# Patient Record
Sex: FEMALE | Race: WHITE | NOT HISPANIC OR LATINO | URBAN - METROPOLITAN AREA
[De-identification: names, ages, dates, MRNs, and addresses within clinical notes are randomized per-mention and may not be internally consistent; named-entity substitution may affect disease eponyms.]

---

## 2021-03-08 ENCOUNTER — INPATIENT (INPATIENT)
Facility: HOSPITAL | Age: 61
LOS: 3 days | Discharge: HOME | DRG: 177 | End: 2021-03-12
Attending: INTERNAL MEDICINE | Admitting: INTERNAL MEDICINE
Payer: COMMERCIAL

## 2021-03-08 VITALS
TEMPERATURE: 98 F | SYSTOLIC BLOOD PRESSURE: 135 MMHG | DIASTOLIC BLOOD PRESSURE: 82 MMHG | RESPIRATION RATE: 19 BRPM | HEART RATE: 90 BPM | OXYGEN SATURATION: 91 %

## 2021-03-08 DIAGNOSIS — U07.1 COVID-19: ICD-10-CM

## 2021-03-08 DIAGNOSIS — Z90.10 ACQUIRED ABSENCE OF UNSPECIFIED BREAST AND NIPPLE: Chronic | ICD-10-CM

## 2021-03-08 LAB
ALBUMIN SERPL ELPH-MCNC: 3.7 G/DL — SIGNIFICANT CHANGE UP (ref 3.3–5.2)
ALBUMIN SERPL ELPH-MCNC: 3.9 G/DL — SIGNIFICANT CHANGE UP (ref 3.3–5.2)
ALP SERPL-CCNC: 61 U/L — SIGNIFICANT CHANGE UP (ref 40–120)
ALP SERPL-CCNC: 67 U/L — SIGNIFICANT CHANGE UP (ref 40–120)
ALT FLD-CCNC: 18 U/L — SIGNIFICANT CHANGE UP
ALT FLD-CCNC: 20 U/L — SIGNIFICANT CHANGE UP
ANION GAP SERPL CALC-SCNC: 13 MMOL/L — SIGNIFICANT CHANGE UP (ref 5–17)
AST SERPL-CCNC: 28 U/L — SIGNIFICANT CHANGE UP
AST SERPL-CCNC: 30 U/L — SIGNIFICANT CHANGE UP
BASOPHILS # BLD AUTO: 0.02 K/UL — SIGNIFICANT CHANGE UP (ref 0–0.2)
BASOPHILS NFR BLD AUTO: 0.3 % — SIGNIFICANT CHANGE UP (ref 0–2)
BILIRUB DIRECT SERPL-MCNC: 0.1 MG/DL — SIGNIFICANT CHANGE UP (ref 0–0.3)
BILIRUB INDIRECT FLD-MCNC: 0.3 MG/DL — SIGNIFICANT CHANGE UP (ref 0.2–1)
BILIRUB SERPL-MCNC: 0.3 MG/DL — LOW (ref 0.4–2)
BILIRUB SERPL-MCNC: 0.4 MG/DL — SIGNIFICANT CHANGE UP (ref 0.4–2)
BUN SERPL-MCNC: 16 MG/DL — SIGNIFICANT CHANGE UP (ref 8–20)
CALCIUM SERPL-MCNC: 8.8 MG/DL — SIGNIFICANT CHANGE UP (ref 8.6–10.2)
CHLORIDE SERPL-SCNC: 96 MMOL/L — LOW (ref 98–107)
CO2 SERPL-SCNC: 24 MMOL/L — SIGNIFICANT CHANGE UP (ref 22–29)
CREAT SERPL-MCNC: 0.74 MG/DL — SIGNIFICANT CHANGE UP (ref 0.5–1.3)
CREAT SERPL-MCNC: 0.8 MG/DL — SIGNIFICANT CHANGE UP (ref 0.5–1.3)
CRP SERPL-MCNC: 38 MG/L — HIGH
D DIMER BLD IA.RAPID-MCNC: 206 NG/ML DDU — SIGNIFICANT CHANGE UP
EOSINOPHIL # BLD AUTO: 0 K/UL — SIGNIFICANT CHANGE UP (ref 0–0.5)
EOSINOPHIL NFR BLD AUTO: 0 % — SIGNIFICANT CHANGE UP (ref 0–6)
FERRITIN SERPL-MCNC: 226 NG/ML — HIGH (ref 15–150)
GLUCOSE BLDC GLUCOMTR-MCNC: 137 MG/DL — HIGH (ref 70–99)
GLUCOSE BLDC GLUCOMTR-MCNC: 137 MG/DL — HIGH (ref 70–99)
GLUCOSE SERPL-MCNC: 135 MG/DL — HIGH (ref 70–99)
HCT VFR BLD CALC: 38.2 % — SIGNIFICANT CHANGE UP (ref 34.5–45)
HGB BLD-MCNC: 13.1 G/DL — SIGNIFICANT CHANGE UP (ref 11.5–15.5)
IMM GRANULOCYTES NFR BLD AUTO: 0.4 % — SIGNIFICANT CHANGE UP (ref 0–1.5)
INR BLD: 1.03 RATIO — SIGNIFICANT CHANGE UP (ref 0.88–1.16)
LYMPHOCYTES # BLD AUTO: 0.6 K/UL — LOW (ref 1–3.3)
LYMPHOCYTES # BLD AUTO: 8.9 % — LOW (ref 13–44)
MCHC RBC-ENTMCNC: 28.9 PG — SIGNIFICANT CHANGE UP (ref 27–34)
MCHC RBC-ENTMCNC: 34.3 GM/DL — SIGNIFICANT CHANGE UP (ref 32–36)
MCV RBC AUTO: 84.1 FL — SIGNIFICANT CHANGE UP (ref 80–100)
MONOCYTES # BLD AUTO: 0.36 K/UL — SIGNIFICANT CHANGE UP (ref 0–0.9)
MONOCYTES NFR BLD AUTO: 5.3 % — SIGNIFICANT CHANGE UP (ref 2–14)
NEUTROPHILS # BLD AUTO: 5.73 K/UL — SIGNIFICANT CHANGE UP (ref 1.8–7.4)
NEUTROPHILS NFR BLD AUTO: 85.1 % — HIGH (ref 43–77)
PLATELET # BLD AUTO: 172 K/UL — SIGNIFICANT CHANGE UP (ref 150–400)
POTASSIUM SERPL-MCNC: 4.1 MMOL/L — SIGNIFICANT CHANGE UP (ref 3.5–5.3)
POTASSIUM SERPL-SCNC: 4.1 MMOL/L — SIGNIFICANT CHANGE UP (ref 3.5–5.3)
PROCALCITONIN SERPL-MCNC: 0.06 NG/ML — SIGNIFICANT CHANGE UP (ref 0.02–0.1)
PROT SERPL-MCNC: 7.2 G/DL — SIGNIFICANT CHANGE UP (ref 6.6–8.7)
PROT SERPL-MCNC: 7.6 G/DL — SIGNIFICANT CHANGE UP (ref 6.6–8.7)
PROTHROM AB SERPL-ACNC: 11.9 SEC — SIGNIFICANT CHANGE UP (ref 10.6–13.6)
RBC # BLD: 4.54 M/UL — SIGNIFICANT CHANGE UP (ref 3.8–5.2)
RBC # FLD: 13.1 % — SIGNIFICANT CHANGE UP (ref 10.3–14.5)
SARS-COV-2 RNA SPEC QL NAA+PROBE: DETECTED
SODIUM SERPL-SCNC: 133 MMOL/L — LOW (ref 135–145)
TROPONIN T SERPL-MCNC: <0.01 NG/ML — SIGNIFICANT CHANGE UP (ref 0–0.06)
WBC # BLD: 6.74 K/UL — SIGNIFICANT CHANGE UP (ref 3.8–10.5)
WBC # FLD AUTO: 6.74 K/UL — SIGNIFICANT CHANGE UP (ref 3.8–10.5)

## 2021-03-08 PROCEDURE — 93010 ELECTROCARDIOGRAM REPORT: CPT

## 2021-03-08 PROCEDURE — 99223 1ST HOSP IP/OBS HIGH 75: CPT

## 2021-03-08 PROCEDURE — 99291 CRITICAL CARE FIRST HOUR: CPT

## 2021-03-08 PROCEDURE — 71045 X-RAY EXAM CHEST 1 VIEW: CPT | Mod: 26

## 2021-03-08 RX ORDER — ASCORBIC ACID 60 MG
1500 TABLET,CHEWABLE ORAL DAILY
Refills: 0 | Status: DISCONTINUED | OUTPATIENT
Start: 2021-03-08 | End: 2021-03-12

## 2021-03-08 RX ORDER — ALBUTEROL 90 UG/1
2 AEROSOL, METERED ORAL ONCE
Refills: 0 | Status: COMPLETED | OUTPATIENT
Start: 2021-03-08 | End: 2021-03-08

## 2021-03-08 RX ORDER — INSULIN LISPRO 100/ML
VIAL (ML) SUBCUTANEOUS
Refills: 0 | Status: DISCONTINUED | OUTPATIENT
Start: 2021-03-08 | End: 2021-03-12

## 2021-03-08 RX ORDER — DEXTROSE 50 % IN WATER 50 %
12.5 SYRINGE (ML) INTRAVENOUS ONCE
Refills: 0 | Status: DISCONTINUED | OUTPATIENT
Start: 2021-03-08 | End: 2021-03-12

## 2021-03-08 RX ORDER — DEXTROSE 50 % IN WATER 50 %
25 SYRINGE (ML) INTRAVENOUS ONCE
Refills: 0 | Status: DISCONTINUED | OUTPATIENT
Start: 2021-03-08 | End: 2021-03-12

## 2021-03-08 RX ORDER — ACETAMINOPHEN 500 MG
650 TABLET ORAL ONCE
Refills: 0 | Status: COMPLETED | OUTPATIENT
Start: 2021-03-08 | End: 2021-03-08

## 2021-03-08 RX ORDER — GLUCAGON INJECTION, SOLUTION 0.5 MG/.1ML
1 INJECTION, SOLUTION SUBCUTANEOUS ONCE
Refills: 0 | Status: DISCONTINUED | OUTPATIENT
Start: 2021-03-08 | End: 2021-03-12

## 2021-03-08 RX ORDER — DEXTROSE 50 % IN WATER 50 %
15 SYRINGE (ML) INTRAVENOUS ONCE
Refills: 0 | Status: DISCONTINUED | OUTPATIENT
Start: 2021-03-08 | End: 2021-03-12

## 2021-03-08 RX ORDER — ACETAMINOPHEN 500 MG
650 TABLET ORAL EVERY 6 HOURS
Refills: 0 | Status: DISCONTINUED | OUTPATIENT
Start: 2021-03-08 | End: 2021-03-12

## 2021-03-08 RX ORDER — REMDESIVIR 5 MG/ML
INJECTION INTRAVENOUS
Refills: 0 | Status: DISCONTINUED | OUTPATIENT
Start: 2021-03-08 | End: 2021-03-12

## 2021-03-08 RX ORDER — REMDESIVIR 5 MG/ML
200 INJECTION INTRAVENOUS EVERY 24 HOURS
Refills: 0 | Status: COMPLETED | OUTPATIENT
Start: 2021-03-08 | End: 2021-03-08

## 2021-03-08 RX ORDER — DEXAMETHASONE 0.5 MG/5ML
6 ELIXIR ORAL ONCE
Refills: 0 | Status: COMPLETED | OUTPATIENT
Start: 2021-03-08 | End: 2021-03-08

## 2021-03-08 RX ORDER — ACETAMINOPHEN 500 MG
2 TABLET ORAL
Qty: 0 | Refills: 0 | DISCHARGE

## 2021-03-08 RX ORDER — SODIUM CHLORIDE 9 MG/ML
1000 INJECTION, SOLUTION INTRAVENOUS
Refills: 0 | Status: DISCONTINUED | OUTPATIENT
Start: 2021-03-08 | End: 2021-03-12

## 2021-03-08 RX ORDER — DEXAMETHASONE 0.5 MG/5ML
6 ELIXIR ORAL DAILY
Refills: 0 | Status: DISCONTINUED | OUTPATIENT
Start: 2021-03-09 | End: 2021-03-12

## 2021-03-08 RX ORDER — ENOXAPARIN SODIUM 100 MG/ML
40 INJECTION SUBCUTANEOUS DAILY
Refills: 0 | Status: DISCONTINUED | OUTPATIENT
Start: 2021-03-08 | End: 2021-03-12

## 2021-03-08 RX ORDER — REMDESIVIR 5 MG/ML
100 INJECTION INTRAVENOUS EVERY 24 HOURS
Refills: 0 | Status: DISCONTINUED | OUTPATIENT
Start: 2021-03-09 | End: 2021-03-12

## 2021-03-08 RX ADMIN — Medication 1500 MILLIGRAM(S): at 17:11

## 2021-03-08 RX ADMIN — Medication 650 MILLIGRAM(S): at 10:17

## 2021-03-08 RX ADMIN — Medication 650 MILLIGRAM(S): at 22:00

## 2021-03-08 RX ADMIN — Medication 6 MILLIGRAM(S): at 10:17

## 2021-03-08 RX ADMIN — ENOXAPARIN SODIUM 40 MILLIGRAM(S): 100 INJECTION SUBCUTANEOUS at 17:11

## 2021-03-08 RX ADMIN — REMDESIVIR 200 MILLIGRAM(S): 5 INJECTION INTRAVENOUS at 17:11

## 2021-03-08 RX ADMIN — Medication 650 MILLIGRAM(S): at 21:20

## 2021-03-08 RX ADMIN — Medication 1 TABLET(S): at 17:12

## 2021-03-08 RX ADMIN — ALBUTEROL 2 PUFF(S): 90 AEROSOL, METERED ORAL at 10:16

## 2021-03-08 RX ADMIN — Medication 650 MILLIGRAM(S): at 11:17

## 2021-03-08 NOTE — ED PROVIDER NOTE - OBJECTIVE STATEMENT
Patient is a 60 year old female with history of breast cancer in remission presenting with weakness and sob. Pt diagnosed with COVID about 10 days ago and notes symptoms have been progressively worsening to the point where she feels like she is unable to eat or take care of herself. Pt Notes mild chest discomfort with breathing. symptoms worse with exertion. No LE pain or swelling. No abd pain n/v/d. No history of VTE or heart disease. - fevers sweats and chills. On zpack and steroids.,

## 2021-03-08 NOTE — ED PROVIDER NOTE - CLINICAL SUMMARY MEDICAL DECISION MAKING FREE TEXT BOX
pt with worsening covid symptoms and hypoxia. will obtain coivd workup, steroids, Tylenol. NC. will need admission

## 2021-03-08 NOTE — CONSULT NOTE ADULT - ASSESSMENT
60y  Female with h/o breast cancer s/p R sided mastectomy and L sided partial reduction who presents with worsening sob. Pt reports she was diagnosed with COVID 2/26 and notes symptoms have been progressively worsening to the point where she feels like she is unable to eat or take care of herself. She has lack of smell and taste and continues to have subjective fevers, last one was last night. Also reports progressively worsening sob with associated non productive cough and intermittent mild chest discomfort from coughing so much. She feels completely out of breath  just ambulating minimally. Others in her family have noted how out of breath she becomes. Due to this she went to go see her pmd 2 days ago who prescribed her an inhaler, steroids and a z pack, of which she took for 2 days and thereafter she felt no improvement in her sx and decided to come to the ED. In the ED noted to be hypoxic requiring O2 via NC. COVID 19 positive.    Acute Hypoxic respiratory failure  COVID-19 infection  B/L Pneumonia   cough  shortness of breath      - COVID 19 PCR Positive   - CXR + PNA  - Continue supportive care measures  - continue to trend inflammatory markers.   - trend CBC with diff, CMP,  CRP, Ferritin,  procalcitonin q 48hours  - Avoid antibiotics unless there is a concern for a bacterial infection  - May consider vitamin C, thiamine and zinc (note lack of evidence to support benefit with COVID 19)  - Discussed Remdesivir with the patient.   - Start Remdesivir 200mg IV x1 followed by 100mg IV daily   - Dexamethasone 6mg PO Daily  - follow up all outstanding cultures  - Trend Fever  - Trend Leukocytosis    Will follow

## 2021-03-08 NOTE — H&P ADULT - HISTORY OF PRESENT ILLNESS
60 year old female with history of breast cancer s/p R sided mastectomy and L sided partial reduction who presents with worsening sob. Pt reports she was diagnosed with COVID about 10 days ago and notes symptoms have been progressively worsening to the point where she feels like she is unable to eat or take care of herself. Pt Notes mild chest discomfort with breathing. symptoms worse with exertion. No LE pain or swelling. No abd pain n/v/d. No history of VTE or heart disease. - fevers sweats and chills. On zpack and steroids Give pain meds and re-evaluate 60 year old female with history of breast cancer s/p R sided mastectomy and L sided partial reduction who presents with worsening sob. Pt reports she was diagnosed with COVID about 10 days ago and notes symptoms have been progressively worsening to the point where she feels like she is unable to eat or take care of herself. She has lack of smell and taste and continues to have subjective fevers, last one was last night. Also reports progressively worsening sob with associated non productive cough and intermittent mild chest discomfort from coughing so much. She feels completely out of breath  just ambulating minimally. Others in her family have noted how out of breath she becomes. Due to this she went to go see her pmd 2 days ago who prescribed her an inhaler, steroids and a z pack, of which she took for 2 days and thereafter she felt no improvement in her sx and decided to come to the ED. She states she tested positive from sick contact: her father who had to go to the [facility] for tx and returned positive thereafter. 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

## 2021-03-08 NOTE — H&P ADULT - ASSESSMENT
60 year old female with history of breast cancer s/p R sided mastectomy and L sided partial reduction (2011) who presents with worsening sob. Tested positive for covid 10 days  prior to admission, currently noted with elevated inflammatory markers, b/l ggo on cxr and hypoxic to high 80s in the ed requiring 2l via nc. Pt admitted with acute respiratory failure with hypoxia 2/2 covid 19 pna.     Admit to any isolation bed       Acute respiratory failure with hypoxia 2/2 covid 19 pna   -afebrile   - no leukocytosis     - covid positive test 10 days ago   - awaiting covid pcr   - elevated inflammatory markers crp: 38   ferritin: 226  -f/u ldh   -d dimer wnl  - will trend inflammatory markers q 48hrs   - c/w decadron 6mg IV QD  - ID consulted will start remdesevir  (will monitor lfts and renal fxn closely on this medication)   - c/w supportive care meds with tylenol prn, tessalon pearls prn and robitussin prn   - pt educated on how to prone/ deep breathing exercises  -c/w proning/ chest pt/ incentive spirometry      Hyponatremia  - likely 2/2 decreased po intake  -sodium 134  - pt instructed to increase diet/ take in fluid orally    Hx  breast cancer  -s/p R sided masectomy and left sided reduction in 2011  - stable in remission    DVT ppx  -c/w lovenox 40mg sq qd (awaiting weight check if bmi >30 will change to bid dosing)     Activity level: Increase as tolerated  Baseline: Independent in ADLs     Dispo: Pt lives at home and 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  60 year old female with history of breast cancer s/p R sided mastectomy and L sided partial reduction (2011) who presents with worsening sob. Tested positive for covid 10 days  prior to admission, currently noted with elevated inflammatory markers, b/l ggo on cxr and hypoxic to high 80s in the ed requiring 2l via nc. Pt admitted with acute respiratory failure with hypoxia 2/2 covid 19 pna.     Admit to any isolation bed       Acute respiratory failure with hypoxia 2/2 covid 19 pna   -afebrile   - no leukocytosis     - covid positive test 10 days ago   - awaiting covid pcr   - elevated inflammatory markers crp: 38   ferritin: 226  -f/u ldh   -d dimer wnl  - will trend inflammatory markers q 48hrs   - c/w decadron 6mg IV QD  - started remdesevir  (will monitor lfts and renal fxn closely on this medication)   - c/w supportive care meds with tylenol prn, tessalon pearls prn and robitussin prn   - pt educated on how to prone/ deep breathing exercises  -c/w proning/ chest pt/ incentive spirometry  -ID consulted noted and appreciated       Hyponatremia  - likely 2/2 decreased po intake  -sodium 134  - pt instructed to increase diet/ take in fluid orally    Hx  breast cancer  -s/p R sided masectomy and left sided reduction in 2011  - stable in remission    DVT ppx  -c/w lovenox 40mg sq qd (awaiting weight check if bmi >30 will change to bid dosing)     Activity level: Increase as tolerated  Baseline: Independent in ADLs     Dispo: Pt lives at home and 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  60 year old female with history of breast cancer s/p R sided mastectomy and L sided partial reduction (2011) who presents with worsening sob. Tested positive for covid 10 days  prior to admission, currently noted with elevated inflammatory markers, b/l ggo on cxr and hypoxic to high 80s in the ed requiring 2l via nc. Pt admitted with acute respiratory failure with hypoxia 2/2 covid 19 pna.     Admit to any isolation bed       Acute respiratory failure with hypoxia 2/2 covid 19 pna   -afebrile   - no leukocytosis     - covid positive test 10 days ago   - awaiting covid pcr   - elevated inflammatory markers crp: 38   ferritin: 226  -f/u ldh   -d dimer wnl  - will trend inflammatory markers q 48hrs   - c/w decadron 6mg IV QD  - started remdesevir  (will monitor lfts and renal fxn closely on this medication)   - c/w supportive care meds with tylenol prn, tessalon pearls prn and robitussin prn   - pt educated on how to prone/ deep breathing exercises  -c/w proning/ chest pt/ incentive spirometry  -ID consulted noted and appreciated       Hyponatremia  - likely 2/2 decreased po intake  -sodium 134  - pt instructed to increase diet/ take in fluid orally    Hx  breast cancer  -s/p R sided masectomy and left sided reduction in 2011  - stable in remission    DVT ppx  -c/w lovenox 40mg sq qd     Activity level: Increase as tolerated  Baseline: Independent in ADLs     Dispo: Pt lives at home and 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  60 year old female with history of breast cancer s/p R sided mastectomy and L sided partial reduction (2011) who presents with worsening sob. Tested positive for covid 10 days  prior to admission, currently noted with elevated inflammatory markers, b/l ggo on cxr and hypoxic to high 80s in the ed requiring 2l via nc. Pt admitted with acute respiratory failure with hypoxia 2/2 covid 19 pna.     Admit to any isolation bed       Acute respiratory failure with hypoxia 2/2 covid 19 pna   -afebrile   - no leukocytosis     - covid positive test 10 days ago   - awaiting covid pcr   - elevated inflammatory markers crp: 38   ferritin: 226  -f/u ldh   -d dimer wnl  - will trend inflammatory markers q 48hrs   - c/w decadron 6mg IV QD    - accuchecks ACHS TID and mod sliding scale while on steroids)   - started remdesevir  (will monitor lfts and renal fxn closely on this medication)   - c/w supportive care meds with tylenol prn, tessalon pearls prn and robitussin prn   - pt educated on how to prone/ deep breathing exercises  -c/w proning/ chest pt/ incentive spirometry  -ID consulted noted and appreciated       Hyponatremia  - likely 2/2 decreased po intake  -sodium 134  - pt instructed to increase diet/ take in fluid orally    Hx  breast cancer  -s/p R sided masectomy and left sided reduction in 2011  - stable in remission    DVT ppx  -c/w lovenox 40mg sq qd     Activity level: Increase as tolerated  Baseline: Independent in ADLs     Dispo: Pt lives at home and 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

## 2021-03-08 NOTE — ED ADULT TRIAGE NOTE - CHIEF COMPLAINT QUOTE
Patient arrived to ED today with c/o chest pains and SOB.  Patient tested positive for COVID-19 on the 26th of Feb she states.

## 2021-03-08 NOTE — ED PROVIDER NOTE - PMH
Malignant neoplasm of female breast, unspecified estrogen receptor status, unspecified laterality, unspecified site of breast

## 2021-03-08 NOTE — ED ADULT NURSE NOTE - OBJECTIVE STATEMENT
Assumed care at 1015 pt co weakness, decrease appetite, SOB and chest discomfort after being diagnosed with COVID, pt has hx of anxiety but feels that it is much worse now. pt denies any fevers, chills, N.V, diarrhea, lightheaded. pt also states she had a syncopal event a couple of days ago but never went to the ED. pt placed on cardiac monitor, pulse ox and given oxygen 2L.

## 2021-03-08 NOTE — CONSULT NOTE ADULT - SUBJECTIVE AND OBJECTIVE BOX
[facility]  INFECTIOUS DISEASES AND INTERNAL MEDICINE at [place]  =======================================================  Marshall Enamorado MD  Diplomates American Board of Internal Medicine and Infectious Diseases  Tel: [phone]      Fax: [phone]  =======================================================      [id number]  XANDER BRICENO    CC: Patient is a 60y old  Female who presents with a chief complaint of SOB (08 Mar 2021 12:39)      60y  Female with h/o breast cancer s/p R sided mastectomy and L sided partial reduction who presents with worsening sob. Pt reports she was diagnosed with COVID 2/26 and notes symptoms have been progressively worsening to the point where she feels like she is unable to eat or take care of herself. She has lack of smell and taste and continues to have subjective fevers, last one was last night. Also reports progressively worsening sob with associated non productive cough and intermittent mild chest discomfort from coughing so much. She feels completely out of breath  just ambulating minimally. Others in her family have noted how out of breath she becomes. Due to this she went to go see her pmd 2 days ago who prescribed her an inhaler, steroids and a z pack, of which she took for 2 days and thereafter she felt no improvement in her sx and decided to come to the ED. In the ED noted to be hypoxic requiring O2 via NC. COVID 19 positive. ID input requested.       Past Medical & Surgical Hx:  Malignant neoplasm of female breast, unspecified estrogen receptor status, unspecified laterality, unspecified site of breast  History of mastectomy, unspecified laterality      Social Hx:  Social ETOH       FAMILY HISTORY:  FH: lung cancer - Father  Breast Cancer - Aunt      Allergies  Keflex (Rash)       REVIEW OF SYSTEMS:  CONSTITUTIONAL:  No Fever or chills  HEENT:  No diplopia or blurred vision.  No earache, sore throat or runny nose.  CARDIOVASCULAR:  No pressure, squeezing, strangling, tightness, heaviness or aching about the chest, neck, axilla or epigastrium.  RESPIRATORY:  + cough, + shortness of breath  GASTROINTESTINAL:  No nausea, vomiting or diarrhea.  GENITOURINARY:  No dysuria, frequency or urgency.   MUSCULOSKELETAL:  no joint aches, no muscle pain  SKIN:  No change in skin, hair or nails.  NEUROLOGIC:  No Headaches, seizures  PSYCHIATRIC:  No disorder of thought or mood.  ENDOCRINE:  No heat or cold intolerance  HEMATOLOGICAL:  No easy bruising or bleeding.       Physical Exam:  GEN: NAD, pleasant  HEENT: normocephalic and atraumatic. EOMI. PERRL.  Anicteric  NECK: Supple.   LUNGS: Coarse BS B/L  HEART: Regular rate and rhythm   ABDOMEN: Soft, nontender, and nondistended.  Positive bowel sounds.    : No CVA tenderness  EXTREMITIES: Without any edema.  MSK: No joint swelling  NEUROLOGIC: No Focal Deficits  PSYCHIATRIC: Appropriate affect .  SKIN: No Rash      Weight (kg): 74.525 (03-08 @ 13:41)      Vitals:  T(F): 97.8 (08 Mar 2021 14:32), Max: 97.8 (08 Mar 2021 14:32)  HR: 76 (08 Mar 2021 14:32)  BP: 113/70 (08 Mar 2021 14:32)  RR: 16 (08 Mar 2021 14:32)  SpO2: 96% (08 Mar 2021 14:32) (91% - 96%)  temp max in last 48H T(F): , Max: 97.8 (03-08-21 @ 14:32)      Current Antibiotics:      Other medications:  ascorbic acid 1500 milliGRAM(s) Oral daily  dextrose 40% Gel 15 Gram(s) Oral once  dextrose 5%. 1000 milliLiter(s) IV Continuous <Continuous>  dextrose 5%. 1000 milliLiter(s) IV Continuous <Continuous>  dextrose 50% Injectable 25 Gram(s) IV Push once  dextrose 50% Injectable 12.5 Gram(s) IV Push once  dextrose 50% Injectable 25 Gram(s) IV Push once  enoxaparin Injectable 40 milliGRAM(s) SubCutaneous daily  glucagon  Injectable 1 milliGRAM(s) IntraMuscular once  insulin lispro (ADMELOG) corrective regimen sliding scale   SubCutaneous three times a day before meals  multivitamin 1 Tablet(s) Oral daily                 13.1   6.74  )-----------( 172      ( 08 Mar 2021 10:36 )             38.2     03-08    133<L>  |  96<L>  |  16.0  ----------------------------<  135<H>  4.1   |  24.0  |  0.80    Ca    8.8      08 Mar 2021 10:36    TPro  7.2  /  Alb  3.7  /  TBili  0.3<L>  /  DBili  x   /  AST  28  /  ALT  18  /  AlkPhos  61  03-08        WBC Count: 6.74 K/uL (03-08-21 @ 10:36)    Creatinine, Serum: 0.80 mg/dL (03-08-21 @ 10:36)    C-Reactive Protein, Serum: 38 mg/L (03-08-21 @ 10:36)    Ferritin, Serum: 226 ng/mL (03-08-21 @ 10:36)    Procalcitonin, Serum: 0.06 ng/mL (03-08-21 @ 10:36)    COVID-19 PCR: Detected (03-08-21 @ 10:39)      < from: Xray Chest 1 View- PORTABLE-Urgent (03.08.21 @ 11:06) >  EXAM:  XR CHEST PORTABLE URGENT 1V                          PROCEDURE DATE:  03/08/2021      INTERPRETATION:  TECHNIQUE: Single portable view of the chest.    COMPARISON: None.    CLINICAL HISTORY: Shortness of Breath, Cough,  Fever    FINDINGS:    Single frontal view of the chest demonstrates bilateral lower lobe infiltrates/atelectasis, right worse than left. Right axillary surgical clips. The cardiomediastinal silhouette is normal. No acute osseous abnormalities. Overlying EKG leads andwires are noted.  Consider chest CT as clinically warranted.    IMPRESSION: Bilateral lower lobe infiltrate/atelectasis, right worse than left.    < end of copied text >

## 2021-03-08 NOTE — H&P ADULT - NSICDXPASTMEDICALHX_GEN_ALL_CORE_FT
PAST MEDICAL HISTORY:  Malignant neoplasm of female breast, unspecified estrogen receptor status, unspecified laterality, unspecified site of breast

## 2021-03-08 NOTE — ED PROVIDER NOTE - GASTROINTESTINAL NEGATIVE STATEMENT, MLM
no abdominal pain, no bloating, no constipation, no diarrhea, no nausea and no vomiting.
Images sent/Medical Records sent

## 2021-03-09 LAB
A1C WITH ESTIMATED AVERAGE GLUCOSE RESULT: 5.7 % — HIGH (ref 4–5.6)
ALBUMIN SERPL ELPH-MCNC: 3.6 G/DL — SIGNIFICANT CHANGE UP (ref 3.3–5.2)
ALP SERPL-CCNC: 55 U/L — SIGNIFICANT CHANGE UP (ref 40–120)
ALT FLD-CCNC: 16 U/L — SIGNIFICANT CHANGE UP
ANION GAP SERPL CALC-SCNC: 14 MMOL/L — SIGNIFICANT CHANGE UP (ref 5–17)
AST SERPL-CCNC: 25 U/L — SIGNIFICANT CHANGE UP
BASOPHILS # BLD AUTO: 0.01 K/UL — SIGNIFICANT CHANGE UP (ref 0–0.2)
BASOPHILS NFR BLD AUTO: 0.1 % — SIGNIFICANT CHANGE UP (ref 0–2)
BILIRUB DIRECT SERPL-MCNC: 0.1 MG/DL — SIGNIFICANT CHANGE UP (ref 0–0.3)
BILIRUB INDIRECT FLD-MCNC: 0.2 MG/DL — SIGNIFICANT CHANGE UP (ref 0.2–1)
BILIRUB SERPL-MCNC: 0.3 MG/DL — LOW (ref 0.4–2)
BUN SERPL-MCNC: 16 MG/DL — SIGNIFICANT CHANGE UP (ref 8–20)
CALCIUM SERPL-MCNC: 8.6 MG/DL — SIGNIFICANT CHANGE UP (ref 8.6–10.2)
CHLORIDE SERPL-SCNC: 96 MMOL/L — LOW (ref 98–107)
CO2 SERPL-SCNC: 23 MMOL/L — SIGNIFICANT CHANGE UP (ref 22–29)
CREAT SERPL-MCNC: 0.63 MG/DL — SIGNIFICANT CHANGE UP (ref 0.5–1.3)
EOSINOPHIL # BLD AUTO: 0.16 K/UL — SIGNIFICANT CHANGE UP (ref 0–0.5)
EOSINOPHIL NFR BLD AUTO: 2 % — SIGNIFICANT CHANGE UP (ref 0–6)
ESTIMATED AVERAGE GLUCOSE: 117 MG/DL — HIGH (ref 68–114)
GLUCOSE BLDC GLUCOMTR-MCNC: 109 MG/DL — HIGH (ref 70–99)
GLUCOSE BLDC GLUCOMTR-MCNC: 113 MG/DL — HIGH (ref 70–99)
GLUCOSE BLDC GLUCOMTR-MCNC: 124 MG/DL — HIGH (ref 70–99)
GLUCOSE BLDC GLUCOMTR-MCNC: 138 MG/DL — HIGH (ref 70–99)
GLUCOSE SERPL-MCNC: 94 MG/DL — SIGNIFICANT CHANGE UP (ref 70–99)
HCT VFR BLD CALC: 35.6 % — SIGNIFICANT CHANGE UP (ref 34.5–45)
HCV AB S/CO SERPL IA: 0.05 S/CO — SIGNIFICANT CHANGE UP (ref 0–0.99)
HCV AB SERPL-IMP: SIGNIFICANT CHANGE UP
HGB BLD-MCNC: 12.1 G/DL — SIGNIFICANT CHANGE UP (ref 11.5–15.5)
IMM GRANULOCYTES NFR BLD AUTO: 0.4 % — SIGNIFICANT CHANGE UP (ref 0–1.5)
INR BLD: 1.05 RATIO — SIGNIFICANT CHANGE UP (ref 0.88–1.16)
LYMPHOCYTES # BLD AUTO: 0.61 K/UL — LOW (ref 1–3.3)
LYMPHOCYTES # BLD AUTO: 7.8 % — LOW (ref 13–44)
MCHC RBC-ENTMCNC: 28.5 PG — SIGNIFICANT CHANGE UP (ref 27–34)
MCHC RBC-ENTMCNC: 34 GM/DL — SIGNIFICANT CHANGE UP (ref 32–36)
MCV RBC AUTO: 83.8 FL — SIGNIFICANT CHANGE UP (ref 80–100)
MONOCYTES # BLD AUTO: 0.39 K/UL — SIGNIFICANT CHANGE UP (ref 0–0.9)
MONOCYTES NFR BLD AUTO: 5 % — SIGNIFICANT CHANGE UP (ref 2–14)
NEUTROPHILS # BLD AUTO: 6.66 K/UL — SIGNIFICANT CHANGE UP (ref 1.8–7.4)
NEUTROPHILS NFR BLD AUTO: 84.7 % — HIGH (ref 43–77)
PLATELET # BLD AUTO: 185 K/UL — SIGNIFICANT CHANGE UP (ref 150–400)
POTASSIUM SERPL-MCNC: 4.4 MMOL/L — SIGNIFICANT CHANGE UP (ref 3.5–5.3)
POTASSIUM SERPL-SCNC: 4.4 MMOL/L — SIGNIFICANT CHANGE UP (ref 3.5–5.3)
PROT SERPL-MCNC: 6.6 G/DL — SIGNIFICANT CHANGE UP (ref 6.6–8.7)
PROTHROM AB SERPL-ACNC: 12.2 SEC — SIGNIFICANT CHANGE UP (ref 10.6–13.6)
RBC # BLD: 4.25 M/UL — SIGNIFICANT CHANGE UP (ref 3.8–5.2)
RBC # FLD: 13.1 % — SIGNIFICANT CHANGE UP (ref 10.3–14.5)
SARS-COV-2 IGG SERPL QL IA: NEGATIVE — SIGNIFICANT CHANGE UP
SARS-COV-2 IGM SERPL IA-ACNC: 0.37 INDEX — SIGNIFICANT CHANGE UP
SODIUM SERPL-SCNC: 133 MMOL/L — LOW (ref 135–145)
WBC # BLD: 7.86 K/UL — SIGNIFICANT CHANGE UP (ref 3.8–10.5)
WBC # FLD AUTO: 7.86 K/UL — SIGNIFICANT CHANGE UP (ref 3.8–10.5)

## 2021-03-09 PROCEDURE — 99232 SBSQ HOSP IP/OBS MODERATE 35: CPT

## 2021-03-09 PROCEDURE — 99233 SBSQ HOSP IP/OBS HIGH 50: CPT

## 2021-03-09 RX ADMIN — Medication 6 MILLIGRAM(S): at 05:04

## 2021-03-09 RX ADMIN — REMDESIVIR 500 MILLIGRAM(S): 5 INJECTION INTRAVENOUS at 14:46

## 2021-03-09 RX ADMIN — Medication 100 MILLIGRAM(S): at 11:17

## 2021-03-09 RX ADMIN — Medication 1500 MILLIGRAM(S): at 11:16

## 2021-03-09 RX ADMIN — ENOXAPARIN SODIUM 40 MILLIGRAM(S): 100 INJECTION SUBCUTANEOUS at 11:16

## 2021-03-09 RX ADMIN — Medication 1 TABLET(S): at 11:16

## 2021-03-09 NOTE — PROGRESS NOTE ADULT - ATTENDING COMMENTS
Pt seen and examined.  Dry cough.  Slept proned this afternoon- no surrent sob on 2 L. no CP, abd pain.  loose stool earlier    a and O x 4  RRR  lung: crackles most prominent right.  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

## 2021-03-10 LAB
ALBUMIN SERPL ELPH-MCNC: 3.6 G/DL — SIGNIFICANT CHANGE UP (ref 3.3–5.2)
ALP SERPL-CCNC: 60 U/L — SIGNIFICANT CHANGE UP (ref 40–120)
ALT FLD-CCNC: 19 U/L — SIGNIFICANT CHANGE UP
AST SERPL-CCNC: 25 U/L — SIGNIFICANT CHANGE UP
BILIRUB DIRECT SERPL-MCNC: 0.1 MG/DL — SIGNIFICANT CHANGE UP (ref 0–0.3)
BILIRUB INDIRECT FLD-MCNC: 0.3 MG/DL — SIGNIFICANT CHANGE UP (ref 0.2–1)
BILIRUB SERPL-MCNC: 0.4 MG/DL — SIGNIFICANT CHANGE UP (ref 0.4–2)
CREAT SERPL-MCNC: 0.83 MG/DL — SIGNIFICANT CHANGE UP (ref 0.5–1.3)
CRP SERPL-MCNC: 35 MG/L — HIGH
FERRITIN SERPL-MCNC: 230 NG/ML — HIGH (ref 15–150)
GLUCOSE BLDC GLUCOMTR-MCNC: 123 MG/DL — HIGH (ref 70–99)
GLUCOSE BLDC GLUCOMTR-MCNC: 143 MG/DL — HIGH (ref 70–99)
GLUCOSE BLDC GLUCOMTR-MCNC: 176 MG/DL — HIGH (ref 70–99)
GLUCOSE BLDC GLUCOMTR-MCNC: 98 MG/DL — SIGNIFICANT CHANGE UP (ref 70–99)
INR BLD: 1.15 RATIO — SIGNIFICANT CHANGE UP (ref 0.88–1.16)
LDH SERPL L TO P-CCNC: 299 U/L — HIGH (ref 98–192)
PROT SERPL-MCNC: 6.5 G/DL — LOW (ref 6.6–8.7)
PROTHROM AB SERPL-ACNC: 13.3 SEC — SIGNIFICANT CHANGE UP (ref 10.6–13.6)

## 2021-03-10 PROCEDURE — 99232 SBSQ HOSP IP/OBS MODERATE 35: CPT

## 2021-03-10 RX ADMIN — Medication 650 MILLIGRAM(S): at 02:21

## 2021-03-10 RX ADMIN — ENOXAPARIN SODIUM 40 MILLIGRAM(S): 100 INJECTION SUBCUTANEOUS at 10:46

## 2021-03-10 RX ADMIN — Medication 1 TABLET(S): at 10:46

## 2021-03-10 RX ADMIN — Medication 6 MILLIGRAM(S): at 06:07

## 2021-03-10 RX ADMIN — REMDESIVIR 500 MILLIGRAM(S): 5 INJECTION INTRAVENOUS at 17:18

## 2021-03-10 RX ADMIN — Medication 1500 MILLIGRAM(S): at 10:46

## 2021-03-10 RX ADMIN — Medication 100 MILLIGRAM(S): at 10:46

## 2021-03-10 RX ADMIN — Medication 650 MILLIGRAM(S): at 02:58

## 2021-03-10 RX ADMIN — Medication 100 MILLIGRAM(S): at 10:47

## 2021-03-11 LAB
ALBUMIN SERPL ELPH-MCNC: 3.2 G/DL — LOW (ref 3.3–5.2)
ALP SERPL-CCNC: 58 U/L — SIGNIFICANT CHANGE UP (ref 40–120)
ALT FLD-CCNC: 32 U/L — SIGNIFICANT CHANGE UP
ANION GAP SERPL CALC-SCNC: 10 MMOL/L — SIGNIFICANT CHANGE UP (ref 5–17)
AST SERPL-CCNC: 30 U/L — SIGNIFICANT CHANGE UP
BILIRUB DIRECT SERPL-MCNC: 0.1 MG/DL — SIGNIFICANT CHANGE UP (ref 0–0.3)
BILIRUB INDIRECT FLD-MCNC: 0.3 MG/DL — SIGNIFICANT CHANGE UP (ref 0.2–1)
BILIRUB SERPL-MCNC: 0.4 MG/DL — SIGNIFICANT CHANGE UP (ref 0.4–2)
BUN SERPL-MCNC: 17 MG/DL — SIGNIFICANT CHANGE UP (ref 8–20)
CALCIUM SERPL-MCNC: 8.5 MG/DL — LOW (ref 8.6–10.2)
CHLORIDE SERPL-SCNC: 101 MMOL/L — SIGNIFICANT CHANGE UP (ref 98–107)
CO2 SERPL-SCNC: 26 MMOL/L — SIGNIFICANT CHANGE UP (ref 22–29)
CREAT SERPL-MCNC: 0.72 MG/DL — SIGNIFICANT CHANGE UP (ref 0.5–1.3)
GLUCOSE BLDC GLUCOMTR-MCNC: 106 MG/DL — HIGH (ref 70–99)
GLUCOSE BLDC GLUCOMTR-MCNC: 113 MG/DL — HIGH (ref 70–99)
GLUCOSE BLDC GLUCOMTR-MCNC: 169 MG/DL — HIGH (ref 70–99)
GLUCOSE BLDC GLUCOMTR-MCNC: 183 MG/DL — HIGH (ref 70–99)
GLUCOSE SERPL-MCNC: 78 MG/DL — SIGNIFICANT CHANGE UP (ref 70–99)
HCT VFR BLD CALC: 36.8 % — SIGNIFICANT CHANGE UP (ref 34.5–45)
HGB BLD-MCNC: 12.3 G/DL — SIGNIFICANT CHANGE UP (ref 11.5–15.5)
INR BLD: 1.13 RATIO — SIGNIFICANT CHANGE UP (ref 0.88–1.16)
MCHC RBC-ENTMCNC: 28.5 PG — SIGNIFICANT CHANGE UP (ref 27–34)
MCHC RBC-ENTMCNC: 33.4 GM/DL — SIGNIFICANT CHANGE UP (ref 32–36)
MCV RBC AUTO: 85.4 FL — SIGNIFICANT CHANGE UP (ref 80–100)
PLATELET # BLD AUTO: 238 K/UL — SIGNIFICANT CHANGE UP (ref 150–400)
POTASSIUM SERPL-MCNC: 4.1 MMOL/L — SIGNIFICANT CHANGE UP (ref 3.5–5.3)
POTASSIUM SERPL-SCNC: 4.1 MMOL/L — SIGNIFICANT CHANGE UP (ref 3.5–5.3)
PROT SERPL-MCNC: 6.4 G/DL — LOW (ref 6.6–8.7)
PROTHROM AB SERPL-ACNC: 13 SEC — SIGNIFICANT CHANGE UP (ref 10.6–13.6)
RBC # BLD: 4.31 M/UL — SIGNIFICANT CHANGE UP (ref 3.8–5.2)
RBC # FLD: 12.9 % — SIGNIFICANT CHANGE UP (ref 10.3–14.5)
SODIUM SERPL-SCNC: 137 MMOL/L — SIGNIFICANT CHANGE UP (ref 135–145)
WBC # BLD: 8.42 K/UL — SIGNIFICANT CHANGE UP (ref 3.8–10.5)
WBC # FLD AUTO: 8.42 K/UL — SIGNIFICANT CHANGE UP (ref 3.8–10.5)

## 2021-03-11 PROCEDURE — 99232 SBSQ HOSP IP/OBS MODERATE 35: CPT

## 2021-03-11 RX ADMIN — REMDESIVIR 500 MILLIGRAM(S): 5 INJECTION INTRAVENOUS at 16:48

## 2021-03-11 RX ADMIN — Medication 6 MILLIGRAM(S): at 05:33

## 2021-03-11 RX ADMIN — ENOXAPARIN SODIUM 40 MILLIGRAM(S): 100 INJECTION SUBCUTANEOUS at 09:07

## 2021-03-11 RX ADMIN — Medication 2: at 12:02

## 2021-03-11 RX ADMIN — Medication 1500 MILLIGRAM(S): at 09:07

## 2021-03-11 RX ADMIN — Medication 1 TABLET(S): at 09:08

## 2021-03-11 RX ADMIN — Medication 2: at 09:06

## 2021-03-11 NOTE — PROGRESS NOTE ADULT - ASSESSMENT
60 year old female with history of breast cancer 2009 s/p R sided mastectomy and L sided partial reduction (2011) who presents with worsening sob. Tested positive for covid 10 days  prior to admission, currently noted with elevated inflammatory markers, b/l ggo on cxr and hypoxic to high 80s in the ed requiring 2l via nc. Pt admitted with acute respiratory failure with hypoxia 2/2 covid 19 pna. Placed on decadron/remdesivir.  Elevated inflammatory markers.  ID consulted and following.        Acute respiratory failure with hypoxia 2/2 covid 19 pna - Currently on 2 liters nc with desats to 80's with ambulation  - covid positive test 10 days pto   - repeat COVID 3/8 +  - D dimer, LFT WNL. 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  Lots of family support.  anticipate will go home      Dispo: Probable home pending progress. Father was discharged to [facility] from [facility]. Patient is primary caregiver    
60y  Female with h/o breast cancer s/p R sided mastectomy and L sided partial reduction who presents with worsening sob. Pt reports she was diagnosed with COVID 2/26 and notes symptoms have been progressively worsening to the point where she feels like she is unable to eat or take care of herself. She has lack of smell and taste and continues to have subjective fevers, last one was last night. Also reports progressively worsening sob with associated non productive cough and intermittent mild chest discomfort from coughing so much. She feels completely out of breath  just ambulating minimally. Others in her family have noted how out of breath she becomes. Due to this she went to go see her pmd 2 days ago who prescribed her an inhaler, steroids and a z pack, of which she took for 2 days and thereafter she felt no improvement in her sx and decided to come to the ED. In the ED noted to be hypoxic requiring O2 via NC. COVID 19 positive.    Acute Hypoxic respiratory failure  COVID-19 infection  B/L Pneumonia   cough  shortness of breath      - COVID 19 PCR Positive   - CXR + PNA  - Continue supportive care measures  - continue to trend inflammatory markers.   - trend CBC with diff, CMP,  CRP, Ferritin,  procalcitonin q 48hours  - Avoid antibiotics unless there is a concern for a bacterial infection  - May consider vitamin C, thiamine and zinc (note lack of evidence to support benefit with COVID 19)  - Discussed Remdesivir with the patient.   - Continue Remdesivir 200mg IV x1 followed by 100mg IV daily   - Dexamethasone 6mg PO Daily  - follow up all outstanding cultures  - Trend Fever  - Trend Leukocytosis    Will follow  
60 year old female with history of breast cancer 2009 s/p R sided mastectomy and L sided partial reduction (2011) who presents with worsening sob. Tested positive for covid 10 days  prior to admission, currently noted with elevated inflammatory markers, b/l ggo on cxr and hypoxic to high 80s in the ed requiring 2l via nc. Pt admitted with acute respiratory failure with hypoxia 2/2 covid 19 pna. Placed on decadron/remdesivir.  Elevated inflammatory markers.  ID consulted and following.        Acute respiratory failure with hypoxia 2/2 covid 19 pna - Currently on 2 liters nc with desats to 80's with ambulation  - covid positive test 10 days pto   - repeat COVID 3/8 +  - D dimer, LFT WNL. 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  Lots of family support.  discussed discharge with pt- she does not feel ready today- aim for the am to home      Dispo: Probable home pending progress. Father was discharged to [facility] from [facility]. Patient is primary caregiver    
60 year old female with history of breast cancer s/p R sided mastectomy and L sided partial reduction (2011) who presents with worsening sob. Tested positive for covid 10 days  prior to admission, currently noted with elevated inflammatory markers, b/l ggo on cxr and hypoxic to high 80s in the ed requiring 2l via nc. Pt admitted with acute respiratory failure with hypoxia 2/2 covid 19 pna.         Acute respiratory failure with hypoxia 2/2 covid 19 pna - Currently on 2 liters nc  -afebrile   - no leukocytosis     - covid positive test 10 days ago   - repeat COVID 3/8 +  - D dimer WNL  - c/w decadron 6mg IV QD/Remdesivir   - accuchecks ACHS TID and mod sliding scale while on steroids) HGA1c 5.7  - c/w supportive care meds with tylenol prn, tessalon pearls prn and robitussin prn   - encourage prone/ deep breathing exercises  - chest pt/ incentive spirometry  -ID following       Hyponatremia  - likely 2/2 decreased po intake  - monitor    Hx  breast cancer  -s/p R sided masectomy and left sided reduction in 2011  - stable in remission    DVT ppx  -c/w lovenox 40mg sq qd     Activity level: Increase as tolerated  Baseline: Independent in ADLs     Dispo: Probable home pending progress. Father was discharged to [facility] from [facility]. Patient is primary caregiver
60y  Female with h/o breast cancer s/p R sided mastectomy and L sided partial reduction who presents with worsening sob. Pt reports she was diagnosed with COVID 2/26 and notes symptoms have been progressively worsening to the point where she feels like she is unable to eat or take care of herself. She has lack of smell and taste and continues to have subjective fevers, last one was last night. Also reports progressively worsening sob with associated non productive cough and intermittent mild chest discomfort from coughing so much. She feels completely out of breath  just ambulating minimally. Others in her family have noted how out of breath she becomes. Due to this she went to go see her pmd 2 days ago who prescribed her an inhaler, steroids and a z pack, of which she took for 2 days and thereafter she felt no improvement in her sx and decided to come to the ED. In the ED noted to be hypoxic requiring O2 via NC. COVID 19 positive.    Acute Hypoxic respiratory failure  COVID-19 infection  B/L Pneumonia   cough  shortness of breath      - COVID 19 PCR Positive   - CXR + PNA  - Continue supportive care measures  - continue to trend inflammatory markers.   - trend CBC with diff, CMP,  CRP, Ferritin,  procalcitonin q 48hours  - Avoid antibiotics unless there is a concern for a bacterial infection  - May consider vitamin C, thiamine and zinc (note lack of evidence to support benefit with COVID 19)  - Discussed Remdesivir with the patient.   - Continue Remdesivir 200mg IV x1 followed by 100mg IV daily   - Dexamethasone 6mg PO Daily  - follow up all outstanding cultures  - Trend Fever  - Trend Leukocytosis    Will follow  
60y  Female with h/o breast cancer s/p R sided mastectomy and L sided partial reduction who presents with worsening sob. Pt reports she was diagnosed with COVID 2/26 and notes symptoms have been progressively worsening to the point where she feels like she is unable to eat or take care of herself. She has lack of smell and taste and continues to have subjective fevers, last one was last night. Also reports progressively worsening sob with associated non productive cough and intermittent mild chest discomfort from coughing so much. She feels completely out of breath  just ambulating minimally. Others in her family have noted how out of breath she becomes. Due to this she went to go see her pmd 2 days ago who prescribed her an inhaler, steroids and a z pack, of which she took for 2 days and thereafter she felt no improvement in her sx and decided to come to the ED. In the ED noted to be hypoxic requiring O2 via NC. COVID 19 positive.    Acute Hypoxic respiratory failure  COVID-19 infection  B/L Pneumonia   cough  shortness of breath      - COVID 19 PCR Positive   - CXR + PNA  - Continue supportive care measures  - continue to trend inflammatory markers.   - trend CBC with diff, CMP,  CRP, Ferritin,  procalcitonin q 48hours  - Avoid antibiotics unless there is a concern for a bacterial infection  - May consider vitamin C, thiamine and zinc (note lack of evidence to support benefit with COVID 19)  - Discussed Remdesivir with the patient.   - Continue Remdesivir 200mg IV x1 followed by 100mg IV daily, since on RA can stop if discharged prior to 3/12  - Dexamethasone 6mg PO Daily  - follow up all outstanding cultures  - Trend Fever  - Trend Leukocytosis      Will sign off. Please call PRN.

## 2021-03-11 NOTE — PROGRESS NOTE ADULT - SUBJECTIVE AND OBJECTIVE BOX
CC: SOB/COVID PNA     INTERVAL HPI/OVERNIGHT EVENTS: Patient seen and examined. No acute issues overnight. +Cough, +ISSA. Denies chest pain, nausea, vomiting, fever, chills. +Fatigue.     Vital Signs Last 24 Hrs  T(C): 36.5 (09 Mar 2021 08:41), Max: 36.6 (08 Mar 2021 14:32)  T(F): 97.7 (09 Mar 2021 08:41), Max: 97.8 (08 Mar 2021 14:32)  HR: 76 (09 Mar 2021 08:41) (76 - 77)  BP: 108/69 (09 Mar 2021 08:41) (107/68 - 114/73)  BP(mean): --  RR: 18 (09 Mar 2021 08:41) (16 - 18)  SpO2: 90% (09 Mar 2021 08:41) (90% - 96%)    PHYSICAL EXAM:    General: Well developed; well nourished; in no acute distress  HEENT: NC/AT  Respiratory: Decrease BS B/L. No wheezes, rales or rhonchi  Cardiovascular: Regular rate and rhythm. S1 and S2 Normal; No murmurs, gallops or rubs  Gastrointestinal: Soft non-tender non-distended; Normal bowel sounds  Extremities: Normal range of motion, No clubbing, cyanosis or edema  Vascular: Peripheral pulses palpable 2+ bilaterally  Neurological: Alert and oriented x4, no focal deficits  Skin: Warm and dry. No acute rash  Psychiatric: Cooperative and appropriate  I&O's Detail    08 Mar 2021 07:01  -  09 Mar 2021 07:00  --------------------------------------------------------  IN:    Oral Fluid: 300 mL  Total IN: 300 mL    OUT:    Voided (mL): 650 mL  Total OUT: 650 mL    Total NET: -350 mL          CARDIAC MARKERS ( 08 Mar 2021 10:36 )  x     / <0.01 ng/mL / x     / x     / x                                12.1   7.86  )-----------( 185      ( 09 Mar 2021 07:48 )             35.6     09 Mar 2021 07:48    133    |  96     |  16.0   ----------------------------<  94     4.4     |  23.0   |  0.63     Ca    8.6        09 Mar 2021 07:48    TPro  6.6    /  Alb  3.6    /  TBili  0.3    /  DBili  0.1    /  AST  25     /  ALT  16     /  AlkPhos  55     09 Mar 2021 07:48    PT/INR - ( 09 Mar 2021 07:48 )   PT: 12.2 sec;   INR: 1.05 ratio           CAPILLARY BLOOD GLUCOSE      POCT Blood Glucose.: 124 mg/dL (09 Mar 2021 13:06)  POCT Blood Glucose.: 109 mg/dL (09 Mar 2021 08:29)  POCT Blood Glucose.: 137 mg/dL (08 Mar 2021 21:19)  POCT Blood Glucose.: 137 mg/dL (08 Mar 2021 16:10)    LIVER FUNCTIONS - ( 09 Mar 2021 07:48 )  Alb: 3.6 g/dL / Pro: 6.6 g/dL / ALK PHOS: 55 U/L / ALT: 16 U/L / AST: 25 U/L / GGT: x               MEDICATIONS  (STANDING):  ascorbic acid 1500 milliGRAM(s) Oral daily  dexAMETHasone  Injectable 6 milliGRAM(s) IV Push daily  dextrose 40% Gel 15 Gram(s) Oral once  dextrose 5%. 1000 milliLiter(s) (50 mL/Hr) IV Continuous <Continuous>  dextrose 5%. 1000 milliLiter(s) (100 mL/Hr) IV Continuous <Continuous>  dextrose 50% Injectable 25 Gram(s) IV Push once  dextrose 50% Injectable 12.5 Gram(s) IV Push once  dextrose 50% Injectable 25 Gram(s) IV Push once  enoxaparin Injectable 40 milliGRAM(s) SubCutaneous daily  glucagon  Injectable 1 milliGRAM(s) IntraMuscular once  insulin lispro (ADMELOG) corrective regimen sliding scale   SubCutaneous three times a day before meals  multivitamin 1 Tablet(s) Oral daily  remdesivir  IVPB   IV Intermittent   remdesivir  IVPB 100 milliGRAM(s) IV Intermittent every 24 hours    MEDICATIONS  (PRN):  acetaminophen   Tablet .. 650 milliGRAM(s) Oral every 6 hours PRN Temp greater or equal to 38C (100.4F), Mild Pain (1 - 3)  benzonatate 100 milliGRAM(s) Oral every 8 hours PRN Cough  guaiFENesin   Syrup  (Sugar-Free) 100 milliGRAM(s) Oral every 6 hours PRN Cough      RADIOLOGY & ADDITIONAL TESTS:  
[facility] Division of Hospital Medicine      SUBJECTIVE / OVERNIGHT EVENTS:  still feels jittery/anxious.  Ambulating off O2 with lowest sat 88%.  Stable in 90's at rest at [facility].  Not sure she is ready to go home.    Patient denies chest pain,  abd pain, N/V, fever, chills, dysuria or any other complaints. All remainder ROS negative.     MEDICATIONS  (STANDING):  ascorbic acid 1500 milliGRAM(s) Oral daily  dexAMETHasone  Injectable 6 milliGRAM(s) IV Push daily  dextrose 40% Gel 15 Gram(s) Oral once  dextrose 5%. 1000 milliLiter(s) (50 mL/Hr) IV Continuous <Continuous>  dextrose 5%. 1000 milliLiter(s) (100 mL/Hr) IV Continuous <Continuous>  dextrose 50% Injectable 25 Gram(s) IV Push once  dextrose 50% Injectable 12.5 Gram(s) IV Push once  dextrose 50% Injectable 25 Gram(s) IV Push once  enoxaparin Injectable 40 milliGRAM(s) SubCutaneous daily  glucagon  Injectable 1 milliGRAM(s) IntraMuscular once  insulin lispro (ADMELOG) corrective regimen sliding scale   SubCutaneous three times a day before meals  multivitamin 1 Tablet(s) Oral daily  remdesivir  IVPB   IV Intermittent   remdesivir  IVPB 100 milliGRAM(s) IV Intermittent every 24 hours    MEDICATIONS  (PRN):  acetaminophen   Tablet .. 650 milliGRAM(s) Oral every 6 hours PRN Temp greater or equal to 38C (100.4F), Mild Pain (1 - 3)  benzonatate 100 milliGRAM(s) Oral every 8 hours PRN Cough  guaiFENesin   Syrup  (Sugar-Free) 100 milliGRAM(s) Oral every 6 hours PRN Cough        I&O's Summary    Vital Signs Last 24 Hrs  T(C): 36.9 (11 Mar 2021 15:54), Max: 36.9 (11 Mar 2021 15:54)  T(F): 98.4 (11 Mar 2021 15:54), Max: 98.4 (11 Mar 2021 15:54)  HR: 80 (11 Mar 2021 15:54) (74 - 88)  BP: 118/78 (11 Mar 2021 15:54) (104/65 - 119/79)  BP(mean): --  RR: 18 (11 Mar 2021 15:54) (18 - 18)  SpO2: 92% (11 Mar 2021 15:54) (92% - 95%)        CONSTITUTIONAL: A & O x 4, NAD-except mild anxiety well-developed, well-groomed  ENMT: Neck supple, Moist oral mucosa  RESPIRATORY: Normal respiratory effort; lungs are clear to auscultation bilaterally without wheezes/crackles.  No stridor  CARDIOVASCULAR: Regular rate and rhythm, normal S1 and S2.  no murmur. no rub/gallop.  No lower extremity edema.  Peripheral pulses are 2+ bilaterally  ABDOMEN: Soft, non-distended. Nontender to palpation, normoactive bowel sounds, no rebound/guarding; No hepatosplenomegaly  MUSCLOSKELETAL:  No atrophy. no clubbing or cyanosis of digits. no joint swelling or tenderness to palpation  PSYCH: A+O to person, place, and time; affect appropriate  NEUROLOGY: CN 2-12 grossly intact and symmetric; no gross sensory or motor deficits   SKIN: No rashes, no palpable lesions    LABS:                                   12.3   8.42  )-----------( 238      ( 11 Mar 2021 06:30 )             36.8   03-11    137  |  101  |  17.0  ----------------------------<  78  4.1   |  26.0  |  0.72    Ca    8.5<L>      11 Mar 2021 06:30    TPro  6.4<L>  /  Alb  3.2<L>  /  TBili  0.4  /  DBili  0.1  /  AST  30  /  ALT  32  /  AlkPhos  58  03-11                   CAPILLARY BLOOD GLUCOSE      POCT Blood Glucose.: 113 mg/dL (11 Mar 2021 16:47)  POCT Blood Glucose.: 169 mg/dL (11 Mar 2021 12:00)  POCT Blood Glucose.: 183 mg/dL (11 Mar 2021 09:06)  POCT Blood Glucose.: 123 mg/dL (10 Mar 2021 22:09)          RADIOLOGY & ADDITIONAL TESTS:                                            
[facility]  INFECTIOUS DISEASES AND INTERNAL MEDICINE at [place]  =======================================================  Marshall Enamorado MD  Diplomates American Board of Internal Medicine and Infectious Diseases  Tel: [phone]      Fax: [phone]  =======================================================    XANDER BRICENO [id number]    Follow up: COVID 19    Feels better      Allergies:  Keflex (Unknown)       REVIEW OF SYSTEMS:  CONSTITUTIONAL:  No Fever or chills  HEENT:  No diplopia or blurred vision.  No earache, sore throat or runny nose.  CARDIOVASCULAR:  No pressure, squeezing, strangling, tightness, heaviness or aching about the chest, neck, axilla or epigastrium.  RESPIRATORY:  + cough, + shortness of breath  GASTROINTESTINAL:  No nausea, vomiting or diarrhea.  GENITOURINARY:  No dysuria, frequency or urgency.   MUSCULOSKELETAL:  no joint aches, no muscle pain  SKIN:  No change in skin, hair or nails.  NEUROLOGIC:  No Headaches, seizures  PSYCHIATRIC:  No disorder of thought or mood.  ENDOCRINE:  No heat or cold intolerance  HEMATOLOGICAL:  No easy bruising or bleeding.       Physical Exam:  GEN: NAD, pleasant  HEENT: normocephalic and atraumatic. EOMI. PERRL.  Anicteric  NECK: Supple.   LUNGS: Coarse BS B/L  HEART: Regular rate and rhythm   ABDOMEN: Soft, nontender, and nondistended.  Positive bowel sounds.    : No CVA tenderness  EXTREMITIES: Without any edema.  MSK: No joint swelling  NEUROLOGIC: No Focal Deficits  PSYCHIATRIC: Appropriate affect .  SKIN: No Rash      Vitals:  T(F): 97.7 (09 Mar 2021 08:41), Max: 97.8 (08 Mar 2021 14:32)  HR: 76 (09 Mar 2021 08:41)  BP: 108/69 (09 Mar 2021 08:41)  RR: 18 (09 Mar 2021 08:41)  SpO2: 90% (09 Mar 2021 08:41) (90% - 96%)  temp max in last 48H T(F): , Max: 97.8 (03-08-21 @ 14:32)      Current Antibiotics:  remdesivir  IVPB   IV Intermittent   remdesivir  IVPB 100 milliGRAM(s) IV Intermittent every 24 hours    Other medications:  ascorbic acid 1500 milliGRAM(s) Oral daily  dexAMETHasone  Injectable 6 milliGRAM(s) IV Push daily  dextrose 40% Gel 15 Gram(s) Oral once  dextrose 5%. 1000 milliLiter(s) IV Continuous <Continuous>  dextrose 5%. 1000 milliLiter(s) IV Continuous <Continuous>  dextrose 50% Injectable 25 Gram(s) IV Push once  dextrose 50% Injectable 12.5 Gram(s) IV Push once  dextrose 50% Injectable 25 Gram(s) IV Push once  enoxaparin Injectable 40 milliGRAM(s) SubCutaneous daily  glucagon  Injectable 1 milliGRAM(s) IntraMuscular once  insulin lispro (ADMELOG) corrective regimen sliding scale   SubCutaneous three times a day before meals  multivitamin 1 Tablet(s) Oral daily                 12.1   7.86  )-----------( 185      ( 09 Mar 2021 07:48 )             35.6     03-09    133<L>  |  96<L>  |  16.0  ----------------------------<  94  4.4   |  23.0  |  0.63    Ca    8.6      09 Mar 2021 07:48    TPro  6.6  /  Alb  3.6  /  TBili  0.3<L>  /  DBili  0.1  /  AST  25  /  ALT  16  /  AlkPhos  55  03-09        WBC Count: 7.86 K/uL (03-09-21 @ 07:48)  WBC Count: 6.74 K/uL (03-08-21 @ 10:36)    Creatinine, Serum: 0.63 mg/dL (03-09-21 @ 07:48)  Creatinine, Serum: 0.74 mg/dL (03-08-21 @ 17:17)  Creatinine, Serum: 0.80 mg/dL (03-08-21 @ 10:36)    C-Reactive Protein, Serum: 38 mg/L (03-08-21 @ 10:36)    Ferritin, Serum: 226 ng/mL (03-08-21 @ 10:36)    Procalcitonin, Serum: 0.06 ng/mL (03-08-21 @ 10:36)    COVID-19 PCR: Detected (03-08-21 @ 10:39)      < from: Xray Chest 1 View- PORTABLE-Urgent (03.08.21 @ 11:06) >  EXAM:  XR CHEST PORTABLE URGENT 1V                          PROCEDURE DATE:  03/08/2021      INTERPRETATION:  TECHNIQUE: Single portable view of the chest.    COMPARISON: None.    CLINICAL HISTORY: Shortness of Breath, Cough,  Fever    FINDINGS:    Single frontal view of the chest demonstrates bilateral lower lobe infiltrates/atelectasis, right worse than left. Right axillary surgical clips. The cardiomediastinal silhouette is normal. No acute osseous abnormalities. Overlying EKG leads andwires are noted.  Consider chest CT as clinically warranted.    IMPRESSION: Bilateral lower lobe infiltrate/atelectasis, right worse than left.    < end of copied text >      
[facility]  INFECTIOUS DISEASES AND INTERNAL MEDICINE at [place]  =======================================================  Marshall Enamorado MD  Diplomates American Board of Internal Medicine and Infectious Diseases  Tel: [phone]      Fax: [phone]  =======================================================    XANDER BRICENO [id number]    Follow up: COVID 19    Feels better      Allergies:  Keflex (Unknown)       REVIEW OF SYSTEMS:  CONSTITUTIONAL:  No Fever or chills  HEENT:  No diplopia or blurred vision.  No earache, sore throat or runny nose.  CARDIOVASCULAR:  No pressure, squeezing, strangling, tightness, heaviness or aching about the chest, neck, axilla or epigastrium.  RESPIRATORY:  + cough, + shortness of breath  GASTROINTESTINAL:  No nausea, vomiting or diarrhea.  GENITOURINARY:  No dysuria, frequency or urgency.   MUSCULOSKELETAL:  no joint aches, no muscle pain  SKIN:  No change in skin, hair or nails.  NEUROLOGIC:  No Headaches, seizures  PSYCHIATRIC:  No disorder of thought or mood.  ENDOCRINE:  No heat or cold intolerance  HEMATOLOGICAL:  No easy bruising or bleeding.       Physical Exam:  GEN: NAD, pleasant  HEENT: normocephalic and atraumatic. EOMI. PERRL.  Anicteric  NECK: Supple.   LUNGS: Coarse BS B/L  HEART: Regular rate and rhythm   ABDOMEN: Soft, nontender, and nondistended.  Positive bowel sounds.    : No CVA tenderness  EXTREMITIES: Without any edema.  MSK: No joint swelling  NEUROLOGIC: No Focal Deficits  PSYCHIATRIC: Appropriate affect .  SKIN: No Rash      Vitals:  T(F): 97.8 (10 Mar 2021 05:02), Max: 97.8 (10 Mar 2021 05:02)  HR: 70 (10 Mar 2021 05:02)  BP: 121/78 (10 Mar 2021 05:02)  RR: 18 (10 Mar 2021 05:02)  SpO2: 94% (10 Mar 2021 05:02) (94% - 95%)  temp max in last 48H T(F): , Max: 97.8 (03-08-21 @ 14:32)    Current Antibiotics:  remdesivir  IVPB   IV Intermittent   remdesivir  IVPB 100 milliGRAM(s) IV Intermittent every 24 hours      Other medications:  ascorbic acid 1500 milliGRAM(s) Oral daily  dexAMETHasone  Injectable 6 milliGRAM(s) IV Push daily  dextrose 40% Gel 15 Gram(s) Oral once  dextrose 5%. 1000 milliLiter(s) IV Continuous <Continuous>  dextrose 5%. 1000 milliLiter(s) IV Continuous <Continuous>  dextrose 50% Injectable 25 Gram(s) IV Push once  dextrose 50% Injectable 12.5 Gram(s) IV Push once  dextrose 50% Injectable 25 Gram(s) IV Push once  enoxaparin Injectable 40 milliGRAM(s) SubCutaneous daily  glucagon  Injectable 1 milliGRAM(s) IntraMuscular once  insulin lispro (ADMELOG) corrective regimen sliding scale   SubCutaneous three times a day before meals  multivitamin 1 Tablet(s) Oral daily               12.1   7.86  )-----------( 185      ( 09 Mar 2021 07:48 )             35.6     03-10    x   |  x   |  x   ----------------------------<  x   x    |  x   |  0.83    Ca    8.6      09 Mar 2021 07:48    TPro  6.5<L>  /  Alb  3.6  /  TBili  0.4  /  DBili  0.1  /  AST  25  /  ALT  19  /  AlkPhos  60  03-10        WBC Count: 7.86 K/uL (03-09-21 @ 07:48)  WBC Count: 6.74 K/uL (03-08-21 @ 10:36)    Creatinine, Serum: 0.83 mg/dL (03-10-21 @ 07:54)  Creatinine, Serum: 0.63 mg/dL (03-09-21 @ 07:48)  Creatinine, Serum: 0.74 mg/dL (03-08-21 @ 17:17)  Creatinine, Serum: 0.80 mg/dL (03-08-21 @ 10:36)    C-Reactive Protein, Serum: 35 mg/L (03-10-21 @ 07:55)  C-Reactive Protein, Serum: 38 mg/L (03-08-21 @ 10:36)    Ferritin, Serum: 230 ng/mL (03-10-21 @ 07:55)  Ferritin, Serum: 226 ng/mL (03-08-21 @ 10:36)    Procalcitonin, Serum: 0.06 ng/mL (03-08-21 @ 10:36)    COVID-19 IgG Antibody Interpretation: Negative (03-09-21 @ 13:29)  COVID-19 IgG Antibody Index: 0.37 Index (03-09-21 @ 13:29)  COVID-19 PCR: Detected (03-08-21 @ 10:39)      < from: Xray Chest 1 View- PORTABLE-Urgent (03.08.21 @ 11:06) >  EXAM:  XR CHEST PORTABLE URGENT 1V                          PROCEDURE DATE:  03/08/2021      INTERPRETATION:  TECHNIQUE: Single portable view of the chest.    COMPARISON: None.    CLINICAL HISTORY: Shortness of Breath, Cough,  Fever    FINDINGS:    Single frontal view of the chest demonstrates bilateral lower lobe infiltrates/atelectasis, right worse than left. Right axillary surgical clips. The cardiomediastinal silhouette is normal. No acute osseous abnormalities. Overlying EKG leads andwires are noted.  Consider chest CT as clinically warranted.    IMPRESSION: Bilateral lower lobe infiltrate/atelectasis, right worse than left.    < end of copied text >      
[facility] Division of Hospital Medicine      SUBJECTIVE / OVERNIGHT EVENTS:  Feeling jittery on steroids.  Some breathlessness with movement.  AMbulation on 2 L-->desaturation to 84%, improved with 3 L, now back to 1 L.  No diarrhea.  Cough- dry.  no chest pain.    Patient denies chest pain,  abd pain, N/V, fever, chills, dysuria or any other complaints. All remainder ROS negative.     MEDICATIONS  (STANDING):  ascorbic acid 1500 milliGRAM(s) Oral daily  dexAMETHasone  Injectable 6 milliGRAM(s) IV Push daily  dextrose 40% Gel 15 Gram(s) Oral once  dextrose 5%. 1000 milliLiter(s) (50 mL/Hr) IV Continuous <Continuous>  dextrose 5%. 1000 milliLiter(s) (100 mL/Hr) IV Continuous <Continuous>  dextrose 50% Injectable 25 Gram(s) IV Push once  dextrose 50% Injectable 12.5 Gram(s) IV Push once  dextrose 50% Injectable 25 Gram(s) IV Push once  enoxaparin Injectable 40 milliGRAM(s) SubCutaneous daily  glucagon  Injectable 1 milliGRAM(s) IntraMuscular once  insulin lispro (ADMELOG) corrective regimen sliding scale   SubCutaneous three times a day before meals  multivitamin 1 Tablet(s) Oral daily  remdesivir  IVPB   IV Intermittent   remdesivir  IVPB 100 milliGRAM(s) IV Intermittent every 24 hours    MEDICATIONS  (PRN):  acetaminophen   Tablet .. 650 milliGRAM(s) Oral every 6 hours PRN Temp greater or equal to 38C (100.4F), Mild Pain (1 - 3)  benzonatate 100 milliGRAM(s) Oral every 8 hours PRN Cough  guaiFENesin   Syrup  (Sugar-Free) 100 milliGRAM(s) Oral every 6 hours PRN Cough        I&O's Summary      PHYSICAL EXAM:  Vital Signs Last 24 Hrs  T(C): 36.6 (10 Mar 2021 05:02), Max: 36.6 (10 Mar 2021 05:02)  T(F): 97.8 (10 Mar 2021 05:02), Max: 97.8 (10 Mar 2021 05:02)  HR: 70 (10 Mar 2021 05:02) (70 - 77)  BP: 121/78 (10 Mar 2021 05:02) (121/78 - 123/75)  BP(mean): --  RR: 18 (10 Mar 2021 05:02) (18 - 18)  SpO2: 94% (10 Mar 2021 05:02) (94% - 95%)        CONSTITUTIONAL: A & O x 4, breathless- pressured speech, well-developed, well-groomed  ENMT: Neck supple, Moist oral mucosa  RESPIRATORY: Normal respiratory effort; lungs are clear to auscultation bilaterally without wheezes/crackles.  No stridor  CARDIOVASCULAR: Regular rate and rhythm, normal S1 and S2.  no murmur. no rub/gallop.  No lower extremity edema.  Peripheral pulses are 2+ bilaterally  ABDOMEN: Soft, non-distended. Nontender to palpation, normoactive bowel sounds, no rebound/guarding; No hepatosplenomegaly  MUSCLOSKELETAL:  No atrophy. no clubbing or cyanosis of digits. no joint swelling or tenderness to palpation  PSYCH: A+O to person, place, and time; affect appropriate  NEUROLOGY: CN 2-12 grossly intact and symmetric; no gross sensory or motor deficits   SKIN: No rashes, no palpable lesions    LABS:                        12.1   7.86  )-----------( 185      ( 09 Mar 2021 07:48 )             35.6     03-10    x   |  x   |  x   ----------------------------<  x   x    |  x   |  0.83    Ca    8.6      09 Mar 2021 07:48    TPro  6.5<L>  /  Alb  3.6  /  TBili  0.4  /  DBili  0.1  /  AST  25  /  ALT  19  /  AlkPhos  60  03-10    PT/INR - ( 10 Mar 2021 07:54 )   PT: 13.3 sec;   INR: 1.15 ratio                   CAPILLARY BLOOD GLUCOSE      POCT Blood Glucose.: 98 mg/dL (10 Mar 2021 08:04)  POCT Blood Glucose.: 113 mg/dL (09 Mar 2021 21:23)  POCT Blood Glucose.: 138 mg/dL (09 Mar 2021 17:17)  POCT Blood Glucose.: 124 mg/dL (09 Mar 2021 13:06)        RADIOLOGY & ADDITIONAL TESTS:                                            
[facility]  INFECTIOUS DISEASES AND INTERNAL MEDICINE at [place]  =======================================================  Marshall Enamorado MD  Diplomates American Board of Internal Medicine and Infectious Diseases  Tel: [phone]      Fax: [phone]  =======================================================    XANDER BRICENO [id number]    Follow up: COVID 19    Feels better      Allergies:  Keflex (Unknown)       REVIEW OF SYSTEMS:  CONSTITUTIONAL:  No Fever or chills  HEENT:  No diplopia or blurred vision.  No earache, sore throat or runny nose.  CARDIOVASCULAR:  No pressure, squeezing, strangling, tightness, heaviness or aching about the chest, neck, axilla or epigastrium.  RESPIRATORY:  + cough, + shortness of breath  GASTROINTESTINAL:  No nausea, vomiting or diarrhea.  GENITOURINARY:  No dysuria, frequency or urgency.   MUSCULOSKELETAL:  no joint aches, no muscle pain  SKIN:  No change in skin, hair or nails.  NEUROLOGIC:  No Headaches, seizures  PSYCHIATRIC:  No disorder of thought or mood.  ENDOCRINE:  No heat or cold intolerance  HEMATOLOGICAL:  No easy bruising or bleeding.       Physical Exam:  GEN: NAD, pleasant  HEENT: normocephalic and atraumatic. EOMI. PERRL.  Anicteric  NECK: Supple.   LUNGS: Coarse BS B/L  HEART: Regular rate and rhythm   ABDOMEN: Soft, nontender, and nondistended.  Positive bowel sounds.    : No CVA tenderness  EXTREMITIES: Without any edema.  MSK: No joint swelling  NEUROLOGIC: No Focal Deficits  PSYCHIATRIC: Appropriate affect .  SKIN: No Rash      Vitals:  T(F): 97.9 (11 Mar 2021 08:26), Max: 98.3 (11 Mar 2021 04:48)  HR: 88 (11 Mar 2021 08:26)  BP: 104/65 (11 Mar 2021 08:26)  RR: 18 (11 Mar 2021 08:26)  SpO2: 92% (11 Mar 2021 08:26) (90% - 95%)  temp max in last 48H T(F): , Max: 98.3 (03-11-21 @ 04:48)    Current Antibiotics:  remdesivir  IVPB   IV Intermittent   remdesivir  IVPB 100 milliGRAM(s) IV Intermittent every 24 hours    Other medications:  ascorbic acid 1500 milliGRAM(s) Oral daily  dexAMETHasone  Injectable 6 milliGRAM(s) IV Push daily  dextrose 40% Gel 15 Gram(s) Oral once  dextrose 5%. 1000 milliLiter(s) IV Continuous <Continuous>  dextrose 5%. 1000 milliLiter(s) IV Continuous <Continuous>  dextrose 50% Injectable 25 Gram(s) IV Push once  dextrose 50% Injectable 12.5 Gram(s) IV Push once  dextrose 50% Injectable 25 Gram(s) IV Push once  enoxaparin Injectable 40 milliGRAM(s) SubCutaneous daily  glucagon  Injectable 1 milliGRAM(s) IntraMuscular once  insulin lispro (ADMELOG) corrective regimen sliding scale   SubCutaneous three times a day before meals  multivitamin 1 Tablet(s) Oral daily                            12.3   8.42  )-----------( 238      ( 11 Mar 2021 06:30 )             36.8     03-11    137  |  101  |  17.0  ----------------------------<  78  4.1   |  26.0  |  0.72    Ca    8.5<L>      11 Mar 2021 06:30    TPro  6.4<L>  /  Alb  3.2<L>  /  TBili  0.4  /  DBili  0.1  /  AST  30  /  ALT  32  /  AlkPhos  58  03-11        WBC Count: 8.42 K/uL (03-11-21 @ 06:30)  WBC Count: 7.86 K/uL (03-09-21 @ 07:48)  WBC Count: 6.74 K/uL (03-08-21 @ 10:36)    Creatinine, Serum: 0.72 mg/dL (03-11-21 @ 06:30)  Creatinine, Serum: 0.83 mg/dL (03-10-21 @ 07:54)  Creatinine, Serum: 0.63 mg/dL (03-09-21 @ 07:48)  Creatinine, Serum: 0.74 mg/dL (03-08-21 @ 17:17)  Creatinine, Serum: 0.80 mg/dL (03-08-21 @ 10:36)    C-Reactive Protein, Serum: 35 mg/L (03-10-21 @ 07:55)  C-Reactive Protein, Serum: 38 mg/L (03-08-21 @ 10:36)    Ferritin, Serum: 230 ng/mL (03-10-21 @ 07:55)  Ferritin, Serum: 226 ng/mL (03-08-21 @ 10:36)    Procalcitonin, Serum: 0.06 ng/mL (03-08-21 @ 10:36)    COVID-19 IgG Antibody Interpretation: Negative (03-09-21 @ 13:29)  COVID-19 IgG Antibody Index: 0.37 Index (03-09-21 @ 13:29)  COVID-19 PCR: Detected (03-08-21 @ 10:39)      < from: Xray Chest 1 View- PORTABLE-Urgent (03.08.21 @ 11:06) >  EXAM:  XR CHEST PORTABLE URGENT 1V                          PROCEDURE DATE:  03/08/2021      INTERPRETATION:  TECHNIQUE: Single portable view of the chest.    COMPARISON: None.    CLINICAL HISTORY: Shortness of Breath, Cough,  Fever    FINDINGS:    Single frontal view of the chest demonstrates bilateral lower lobe infiltrates/atelectasis, right worse than left. Right axillary surgical clips. The cardiomediastinal silhouette is normal. No acute osseous abnormalities. Overlying EKG leads andwires are noted.  Consider chest CT as clinically warranted.    IMPRESSION: Bilateral lower lobe infiltrate/atelectasis, right worse than left.    < end of copied text >

## 2021-03-12 VITALS
OXYGEN SATURATION: 97 % | RESPIRATION RATE: 18 BRPM | HEART RATE: 65 BPM | DIASTOLIC BLOOD PRESSURE: 65 MMHG | SYSTOLIC BLOOD PRESSURE: 108 MMHG | TEMPERATURE: 98 F

## 2021-03-12 DIAGNOSIS — Z86.000 PERSONAL HISTORY OF IN-SITU NEOPLASM OF BREAST: ICD-10-CM

## 2021-03-12 LAB
ALBUMIN SERPL ELPH-MCNC: 3.7 G/DL — SIGNIFICANT CHANGE UP (ref 3.3–5.2)
ALP SERPL-CCNC: 64 U/L — SIGNIFICANT CHANGE UP (ref 40–120)
ALT FLD-CCNC: 28 U/L — SIGNIFICANT CHANGE UP
AST SERPL-CCNC: 21 U/L — SIGNIFICANT CHANGE UP
BILIRUB DIRECT SERPL-MCNC: 0.1 MG/DL — SIGNIFICANT CHANGE UP (ref 0–0.3)
BILIRUB INDIRECT FLD-MCNC: 0.5 MG/DL — SIGNIFICANT CHANGE UP (ref 0.2–1)
BILIRUB SERPL-MCNC: 0.6 MG/DL — SIGNIFICANT CHANGE UP (ref 0.4–2)
CREAT SERPL-MCNC: 0.76 MG/DL — SIGNIFICANT CHANGE UP (ref 0.5–1.3)
GLUCOSE BLDC GLUCOMTR-MCNC: 116 MG/DL — HIGH (ref 70–99)
INR BLD: 1.11 RATIO — SIGNIFICANT CHANGE UP (ref 0.88–1.16)
PROT SERPL-MCNC: 6.9 G/DL — SIGNIFICANT CHANGE UP (ref 6.6–8.7)
PROTHROM AB SERPL-ACNC: 12.8 SEC — SIGNIFICANT CHANGE UP (ref 10.6–13.6)

## 2021-03-12 PROCEDURE — 71045 X-RAY EXAM CHEST 1 VIEW: CPT

## 2021-03-12 PROCEDURE — 86803 HEPATITIS C AB TEST: CPT

## 2021-03-12 PROCEDURE — 85610 PROTHROMBIN TIME: CPT

## 2021-03-12 PROCEDURE — 80076 HEPATIC FUNCTION PANEL: CPT

## 2021-03-12 PROCEDURE — 83036 HEMOGLOBIN GLYCOSYLATED A1C: CPT

## 2021-03-12 PROCEDURE — 84145 PROCALCITONIN (PCT): CPT

## 2021-03-12 PROCEDURE — 85379 FIBRIN DEGRADATION QUANT: CPT

## 2021-03-12 PROCEDURE — 84484 ASSAY OF TROPONIN QUANT: CPT

## 2021-03-12 PROCEDURE — 93005 ELECTROCARDIOGRAM TRACING: CPT

## 2021-03-12 PROCEDURE — 99239 HOSP IP/OBS DSCHRG MGMT >30: CPT

## 2021-03-12 PROCEDURE — U0005: CPT

## 2021-03-12 PROCEDURE — 80053 COMPREHEN METABOLIC PANEL: CPT

## 2021-03-12 PROCEDURE — 82565 ASSAY OF CREATININE: CPT

## 2021-03-12 PROCEDURE — 85025 COMPLETE CBC W/AUTO DIFF WBC: CPT

## 2021-03-12 PROCEDURE — 86769 SARS-COV-2 COVID-19 ANTIBODY: CPT

## 2021-03-12 PROCEDURE — 86140 C-REACTIVE PROTEIN: CPT

## 2021-03-12 PROCEDURE — 85027 COMPLETE CBC AUTOMATED: CPT

## 2021-03-12 PROCEDURE — 82962 GLUCOSE BLOOD TEST: CPT

## 2021-03-12 PROCEDURE — 94640 AIRWAY INHALATION TREATMENT: CPT

## 2021-03-12 PROCEDURE — 96374 THER/PROPH/DIAG INJ IV PUSH: CPT

## 2021-03-12 PROCEDURE — 83615 LACTATE (LD) (LDH) ENZYME: CPT

## 2021-03-12 PROCEDURE — U0003: CPT

## 2021-03-12 PROCEDURE — 82728 ASSAY OF FERRITIN: CPT

## 2021-03-12 PROCEDURE — 80048 BASIC METABOLIC PNL TOTAL CA: CPT

## 2021-03-12 PROCEDURE — 36415 COLL VENOUS BLD VENIPUNCTURE: CPT

## 2021-03-12 PROCEDURE — 99291 CRITICAL CARE FIRST HOUR: CPT | Mod: 25

## 2021-03-12 RX ADMIN — Medication 1 TABLET(S): at 08:14

## 2021-03-12 RX ADMIN — Medication 1500 MILLIGRAM(S): at 08:14

## 2021-03-12 RX ADMIN — Medication 6 MILLIGRAM(S): at 05:37

## 2021-03-12 NOTE — DISCHARGE NOTE PROVIDER - NSDCCPCAREPLAN_GEN_ALL_CORE_FT
PRINCIPAL DISCHARGE DIAGNOSIS  Diagnosis: COVID-19  Assessment and Plan of Treatment: s/p remdesivir/decadron x 4 doses.  ID consultation. O2 support--> no O2 required at discharge      SECONDARY DISCHARGE DIAGNOSES  Diagnosis: Hypoxia  Assessment and Plan of Treatment: O2 support.  Stable on RA at discharge

## 2021-03-12 NOTE — DISCHARGE NOTE NURSING/CASE MANAGEMENT/SOCIAL WORK - PATIENT PORTAL LINK FT
You can access the FollowMyHealth Patient Portal offered by [facility] by registering at the following website: [email or web address]. By joining "[employer]’s FollowMyHealth portal, you will also be able to view your health information using other applications (apps) compatible with our system.

## 2021-10-08 NOTE — ED ADULT TRIAGE NOTE - NS ED TRIAGE AVPU SCALE
Alert-The patient is alert, awake and responds to voice. The patient is oriented to time, place, and person. The triage nurse is able to obtain subjective information. Nsaids Counseling: NSAID Counseling: I discussed with the patient that NSAIDs should be taken with food. Prolonged use of NSAIDs can result in the development of stomach ulcers.  Patient advised to stop taking NSAIDs if abdominal pain occurs.  The patient verbalized understanding of the proper use and possible adverse effects of NSAIDs.  All of the patient's questions and concerns were addressed.

## 2023-06-08 NOTE — DISCHARGE NOTE PROVIDER - HOSPITAL COURSE
66 yo [place]ian Muhammad from ID at [facility]. She had TARSHA Gross  60 year old female with history of breast cancer 2009 s/p R sided mastectomy and L sided partial reduction (2011) who presents with worsening sob. Tested positive for covid 10 days  prior to admission, currently noted with elevated inflammatory markers, b/l ggo on cxr and hypoxic to high 80s in the ed requiring 2l via nc. Pt admitted with acute respiratory failure with hypoxia 2/2 covid 19 pna. Placed on decadron/remdesivir.  Elevated inflammatory markers.  ID consulted and following.  Pt required 2 L O2, but was quickly weaned to RA- at d/c had been on RA x > 24 hrs and tolerating ambulation.  She received 4 doses of remdesivir and dexamethasone.l  Afebrile during her stay.    Acute respiratory failure with hypoxia 2/2 covid 19 pna   - covid positive test 10 days pto   - repeat COVID 3/8 +  - D dimer, LFT WNL. elevated ferritin, LDH, CRP  - remdesivir/decadron x 4 doses  -ID consulted  - c/w IS at home    Hx  breast cancer  -s/p R sided mastectomy and left sided reduction in 2011  - stable in remission    Vital Signs Last 24 Hrs  T(C): 37 (12 Mar 2021 05:03), Max: 37 (12 Mar 2021 05:03)  T(F): 98.6 (12 Mar 2021 05:03), Max: 98.6 (12 Mar 2021 05:03)  HR: 83 (12 Mar 2021 05:03) (80 - 83)  BP: 120/69 (12 Mar 2021 05:03) (118/78 - 120/69)  BP(mean): --  RR: 18 (12 Mar 2021 05:03) (18 - 18)  SpO2: 92% (12 Mar 2021 05:03) (92% - 92%)    gen: a and Ox 4.  nad  heent: moist mucosa, neck supple  CV: RRR without m/g/r.  2 + pulses b/l  Lung: CTA b/l with no wheezes/crackles  ABd: benign  ext: no c/c/e  skin: no rash  neuro: non focal

## 2024-04-22 NOTE — ED PROVIDER NOTE - NEUROLOGICAL, MLM
LMOM for patient to call back   Alert and oriented, no focal deficits, no motor or sensory deficits.

## 2024-06-30 ENCOUNTER — EMERGENCY (EMERGENCY)
Facility: HOSPITAL | Age: 64
LOS: 1 days | Discharge: HOME | End: 2024-06-30
Attending: EMERGENCY MEDICINE
Payer: COMMERCIAL

## 2024-06-30 VITALS
TEMPERATURE: 98 F | WEIGHT: 158.95 LBS | SYSTOLIC BLOOD PRESSURE: 169 MMHG | HEIGHT: 67 IN | RESPIRATION RATE: 20 BRPM | DIASTOLIC BLOOD PRESSURE: 95 MMHG | HEART RATE: 89 BPM | OXYGEN SATURATION: 95 %

## 2024-06-30 DIAGNOSIS — Z90.10 ACQUIRED ABSENCE OF UNSPECIFIED BREAST AND NIPPLE: Chronic | ICD-10-CM

## 2024-06-30 PROBLEM — C50.919 MALIGNANT NEOPLASM OF UNSPECIFIED SITE OF UNSPECIFIED FEMALE BREAST: Chronic | Status: ACTIVE | Noted: 2021-03-08

## 2024-06-30 PROCEDURE — 99284 EMERGENCY DEPT VISIT MOD MDM: CPT

## 2024-06-30 PROCEDURE — 99283 EMERGENCY DEPT VISIT LOW MDM: CPT

## 2024-06-30 RX ORDER — SUMATRIPTAN 100 MG/1
25 TABLET, FILM COATED ORAL ONCE
Refills: 0 | Status: COMPLETED | OUTPATIENT
Start: 2024-06-30 | End: 2024-06-30

## 2024-06-30 RX ORDER — IBUPROFEN 200 MG
600 TABLET ORAL ONCE
Refills: 0 | Status: COMPLETED | OUTPATIENT
Start: 2024-06-30 | End: 2024-06-30

## 2024-06-30 RX ORDER — SUMATRIPTAN 100 MG/1
1 TABLET, FILM COATED ORAL
Qty: 14 | Refills: 0
Start: 2024-06-30 | End: 2024-07-13

## 2024-06-30 RX ADMIN — Medication 600 MILLIGRAM(S): at 08:55

## 2024-06-30 RX ADMIN — SUMATRIPTAN 25 MILLIGRAM(S): 100 TABLET, FILM COATED ORAL at 08:57
